# Patient Record
Sex: FEMALE | Race: WHITE
[De-identification: names, ages, dates, MRNs, and addresses within clinical notes are randomized per-mention and may not be internally consistent; named-entity substitution may affect disease eponyms.]

---
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
## 2020-04-05 ENCOUNTER — HOSPITAL ENCOUNTER (EMERGENCY)
Dept: HOSPITAL 25 - UCEAST | Age: 20
Discharge: HOME | End: 2020-04-05
Payer: COMMERCIAL

## 2020-04-05 VITALS — DIASTOLIC BLOOD PRESSURE: 88 MMHG | SYSTOLIC BLOOD PRESSURE: 117 MMHG
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
## 2020-04-05 DIAGNOSIS — S91.331A: Primary | ICD-10-CM

## 2020-04-05 DIAGNOSIS — Z23: ICD-10-CM

## 2020-04-05 PROCEDURE — 99212 OFFICE O/P EST SF 10 MIN: CPT

## 2020-04-05 PROCEDURE — G0463 HOSPITAL OUTPT CLINIC VISIT: HCPCS

## 2020-04-05 PROCEDURE — 90715 TDAP VACCINE 7 YRS/> IM: CPT

## 2020-04-05 NOTE — UC
Lower Extremity/Ankle HPI





- HPI Summary


HPI Summary: 


19-year-old female comes in with a chief complaint of a puncture wound right 

foot.  Patient stepped on part of a iPhone .  One of the prongs that 

goes into a wall socket punctured her foot.  She pulled it out.  Bleeding 

stopped with direct pressure.  Patient's not sure when her last tetanus was.





- History of Current Complaint


Chief Complaint: UCLowerExtremity


Stated Complaint: PUNCTURE WOUND


Time Seen by Provider: 04/05/20 11:37


Hx Last Menstrual Period: BEGINNING OF MARCH


Pain Intensity: 4





- Allergies/Home Medications


Allergies/Adverse Reactions: 


 Allergies











Allergy/AdvReac Type Severity Reaction Status Date / Time


 


No Known Allergies Allergy   Verified 04/05/20 11:40











Home Medications: 


 Home Medications





Cephalexin CAP* [Keflex CAP*] 500 mg PO QID #40 cap 04/05/20 [Rx]











PMH/Surg Hx/FS Hx/Imm Hx


Previously Healthy: Yes





- Surgical History


Surgical History: None





- Family History


Known Family History: 


   Negative: Cardiac Disease, Hypertension, Diabetes





- Social History


Alcohol Use: None


Substance Use Type: Marijuana


Smoking Status (MU): Never Smoked Tobacco





- Immunization History


Most Recent Influenza Vaccination: none


Most Recent Tetanus Shot: unknown


Vaccination Up to Date: Yes





Review of Systems


All Other Systems Reviewed And Are Negative: Yes


Constitutional: Positive: Negative


Skin: Positive: Other - SEE HPI


Eyes: Positive: Negative


ENT: Positive: Negative


Respiratory: Positive: Negative


Gastrointestinal: Positive: Negative


Motor: Positive: Negative


Neurovascular: Positive: Negative


Musculoskeletal: Positive: Other: - SEE HPI


Neurological/Mental Status: Positive: Negative


Psychological: Positive: Negative


Is Patient Immunocompromised?: No





Physical Exam


Triage Information Reviewed: Yes


Appearance: Well-Appearing, Well-Nourished, Pain Distress - MILD WITH PALPATION 

OF WOUND SITE


Vital Signs Reviewed: Yes


Eye Exam: Normal


Eyes: Positive: Conjunctiva Clear


Neck: Positive: Supple


Respiratory: Positive: No respiratory distress


Musculoskeletal: Positive: Strength Intact, ROM Intact


Neurological: Positive: Alert


Psychological: Positive: Age Appropriate Behavior


Skin: Positive: Other - 7 mm flap puncture wound on the sole of right foot.  No 

obvious foreign body.  Bleeding is controlled.





Lower Extremity Course/Dx





- Course


Course Of Treatment: 


The electrical prong from the eye from  is intact therefore the 

potential for foreign body is very low.  The wound is a flap wound which makes 

it appear that the prong went in more sideways and directly in making infection 

much less likely. Wound was cleaned by nursing.  T dap given here in clinic.  

Discussed with the patient to keep the wound clean.  Also given prescription 

for Keflex that she should start if there is any signs of infection.





- Differential Dx/Diagnosis


Provider Diagnosis: 


 Puncture wound of right foot








Discharge ED





- Sign-Out/Discharge


Documenting (check all that apply): Patient Departure


All imaging exams completed and their final reports reviewed: No Studies





- Discharge Plan


Condition: Stable


Disposition: HOME


Prescriptions: 


Cephalexin CAP* [Keflex CAP*] 500 mg PO QID #40 cap


Patient Education Materials:  Puncture Wound (ED)


Referrals: 


Perla Silver MD [Primary Care Provider] - 


Additional Instructions: 


FOLLOW UP WITH YOUR DOCTOR IF NOT COMPLETELY IMPROVED.


START THE ANTIBIOTIC IF THERE ARE ANY SIGNS OF INFECTION.


YOU HAD THE TDAP (TETANUS/DIPHTHERIA/PERTUSSIS) IMMUNIZATION TODAY.


GET REEVALUATED IF NOT IMPROVED OR WORSE; PAIN, SPREAD OF OF INFECTION, FEVER, 

YOU FEEL ILL OR ANY QUESTIONS OR CONCERNS.








- Billing Disposition and Condition


Condition: STABLE


Disposition: Home